# Patient Record
Sex: MALE | Race: WHITE | NOT HISPANIC OR LATINO | Employment: FULL TIME | URBAN - METROPOLITAN AREA
[De-identification: names, ages, dates, MRNs, and addresses within clinical notes are randomized per-mention and may not be internally consistent; named-entity substitution may affect disease eponyms.]

---

## 2022-02-18 ENCOUNTER — APPOINTMENT (OUTPATIENT)
Dept: URGENT CARE | Age: 47
End: 2022-02-18
Payer: OTHER MISCELLANEOUS

## 2022-02-18 ENCOUNTER — APPOINTMENT (OUTPATIENT)
Dept: RADIOLOGY | Age: 47
End: 2022-02-18
Payer: OTHER MISCELLANEOUS

## 2022-02-18 DIAGNOSIS — S69.92XA INJURY OF LEFT HAND, INITIAL ENCOUNTER: ICD-10-CM

## 2022-02-18 DIAGNOSIS — S69.92XA INJURY OF LEFT HAND, INITIAL ENCOUNTER: Primary | ICD-10-CM

## 2022-02-18 PROCEDURE — 73130 X-RAY EXAM OF HAND: CPT

## 2022-02-18 PROCEDURE — G0382 LEV 3 HOSP TYPE B ED VISIT: HCPCS | Performed by: STUDENT IN AN ORGANIZED HEALTH CARE EDUCATION/TRAINING PROGRAM

## 2022-02-18 PROCEDURE — 99283 EMERGENCY DEPT VISIT LOW MDM: CPT | Performed by: STUDENT IN AN ORGANIZED HEALTH CARE EDUCATION/TRAINING PROGRAM

## 2022-02-24 VITALS — HEIGHT: 76 IN | BODY MASS INDEX: 25.57 KG/M2 | WEIGHT: 210 LBS

## 2022-02-24 DIAGNOSIS — S62.605A FRACTURE OF UNSPECIFIED PHALANX OF LEFT RING FINGER, INITIAL ENCOUNTER FOR CLOSED FRACTURE: Primary | ICD-10-CM

## 2022-02-24 PROCEDURE — 99203 OFFICE O/P NEW LOW 30 MIN: CPT | Performed by: PHYSICIAN ASSISTANT

## 2022-02-24 NOTE — PROGRESS NOTES
Assessment:    Left 4th distal phalanx fracture, closed, with subungual hematoma that is improving  Left 3rd distal phalanx small subungual hematoma, no fracture      Plan:    Non-weight bearing to the left 3rd/4th fingers in alumofoam splint  Recommend splinting the middle finger as well and using buddy tape given his injury to this distal phalanx   OTC pain medications as needed   Ice/Elevation  Follow-up 2 weeks for re-evaluation        Problem List Items Addressed This Visit        Musculoskeletal and Integument    Fracture of unspecified phalanx of left ring finger, initial encounter for closed fracture - Primary                   Subjective:     Patient ID:  Lacy Elias is a 40 y o  male    HPI    46-year-old male presenting for evaluation of his left fourth finger  According to the patient, about one week ago he was at work when his left third and fourth finger tips got caught in between last concrete felt immediate pain  He is found have a fracture of the left distal phalanx and referred here for further evaluation he denies any open wounds  Overall he states the subungual hematoma has improved over the last week and is no longer throbbing  The following portions of the patient's history were reviewed and updated as appropriate: allergies, current medications, past family history, past medical history, past social history, past surgical history and problem list     Review of Systems     Objective:    Imaging:  Left 4th finger x-rays 2/18/2022  IMPRESSION:     There is a comminuted fracture of the tuft of the 4th distal phalanx, without significant displacement  Physical Exam     Orthopedic Examination:  Left 4th finger     Inspection:  Subungual hematoma  No open wound    Nail bed intact    Palpation:  Tender to palpation distal phalanx    Range-of-motion:  Limited at the D IP joint, normal range of motion at MCP PIP    Strength:  Deferred    Sensation:  Intact median radial ulnar nerve distribution    Special Tests:  Good cap refill at the fingertips palpable radial pulse

## 2022-02-24 NOTE — LETTER
February 24, 2022     Patient: Arnold Pepe   YOB: 1975   Date of Visit: 2/24/2022       To Whom it May Concern:    Eusebia Schwab  was seen in my office on 2/24/2022  This should serve as improve that the patient was here today  Our recommendation is to continue current light duty restrictions are already in place for an additional two weeks until his next follow-up with Orthopedics  Further work restrictions will be provided at that time  If you have any questions or concerns, please don't hesitate to call           Sincerely,          Perla Bailey PA-C        CC: No Recipients

## 2022-03-10 VITALS
DIASTOLIC BLOOD PRESSURE: 77 MMHG | SYSTOLIC BLOOD PRESSURE: 144 MMHG | HEIGHT: 76 IN | BODY MASS INDEX: 25.57 KG/M2 | WEIGHT: 210 LBS | HEART RATE: 51 BPM

## 2022-03-10 DIAGNOSIS — S62.639A CLOSED FRACTURE OF TUFT OF DISTAL PHALANX OF FINGER: Primary | ICD-10-CM

## 2022-03-10 PROCEDURE — 99214 OFFICE O/P EST MOD 30 MIN: CPT | Performed by: FAMILY MEDICINE

## 2022-03-10 NOTE — PATIENT INSTRUCTIONS
Explained the patient that he has a tuft fracture of the distal phalanx of his ring finger  He also has subungual  hematoma covering the entire nail of the ring finger as well as hematoma of the middle finger covering approximately 40%  Explained that ring finger which may developed nail deformity and is at risk for infection  I recommended maintain try gloves and finger  Recommended finger gloves rubber to maintain dryness  Also recommended against check caring  He may return to work approval all other duties as tolerated

## 2022-03-10 NOTE — PROGRESS NOTES
1  Closed fracture of tuft of distal phalanx of finger       No orders of the defined types were placed in this encounter  IMAGING STUDIES: (I personally reviewed images in PACS and report):     Xray left hand 2/18/22: There is a comminuted fracture of the tuft of the 4th distal phalanx, without significant displacement  PAST REPORTS:        ASSESSMENT/PLAN:   Left 4th distal phalanx comminuted tuft fracture with diffuse subungual hematoma that is improving  Left 3rd distal phalanx small subungual hematoma  DOI: approximately 2/17/22  FUI: 3-4 weeks    Repeat X-ray next visit: Left Hand    Return in about 6 weeks (around 4/21/2022)  Patient Instructions   Explained the patient that he has a tuft fracture of the distal phalanx of his ring finger  He also has subungual  hematoma covering the entire nail of the ring finger as well as hematoma of the middle finger covering approximately 40%  Explained that ring finger which may developed nail deformity and is at risk for infection  I recommended maintain try gloves and finger  Recommended finger gloves rubber to maintain dryness  Also recommended against check caring  He may return to work approval all other duties as tolerated         __________________________________________________________________________    CHIEF COMPLAINT:  Left hand injury    HPI:  Khanh Aragon is a 40 y o  male  who presents for       Visit 03/10/2022:   Left hand injury at work on or about 02/17/2022 with patient's finger was crushed by heavy object  Was seen by physician assistant who found tuft fracture and avoid hematoma  Today patient tells me that it is improving however continues to have nail discoloration is noted in picture below            Review of Systems      Following history reviewed and update:    Past Medical History:   Diagnosis Date    Skin cancer (melanoma) ([facility] [address] ) 2015     Past Surgical History:   Procedure Laterality Date    TOOTH EXTRACTION Social History   Social History     Substance and Sexual Activity   Alcohol Use Yes    Comment: socially     Social History     Substance and Sexual Activity   Drug Use Never     Social History     Tobacco Use   Smoking Status Current Every Day Smoker    Packs/day: 0 25    Types: Cigarettes   Smokeless Tobacco Never Used     No family history on file  No Known Allergies       Physical Exam  /77 (BP Location: Left arm, Patient Position: Sitting, Cuff Size: Adult)   Pulse (!) 51   Ht 6' 4" (1 93 m)   Wt 95 3 kg (210 lb)   BMI 25 56 kg/m²     Constitutional:  see vital signs  Gen: well-developed, normocephalic/atraumatic, well-groomed  Eyes: No inflammation or discharge of conjunctiva or lids; sclera clear   Pharynx: no inflammation, lesion, or mass of lips  Neck: supple, no masses, non-distended  MSK: no inflammation, lesion, mass, or clubbing of nails and digits except for other than mentioned below  SKIN: no visible rashes or skin lesions other than left hand as showed in pictures below  Pulmonary/Chest: Effort normal  No respiratory distress     NEURO: cranial nerves grossly intact  PSYCH:  Alert and oriented to person, place, and time; recent and remote memory intact; mood normal, no depression, anxiety, or agitation, judgment and insight good and intact     Ortho Exam      Left Hand  no erythema  swelling:  Mild distal phalanx ring finger  Tenderness:+ distal phalanx ring finger  rom fingers mcp, pip, dip intact without pain  No digital scissoring or deviation of fingers  no extensor lag  no rotation of fingers  no joint laxity  strenght flexion and extension mcp, pip, dip 5/5  sensation intact  capillary refill intact   Froment sign:  normal  OK sign:  Normal  Thumb extension:  5/5   __________________________________________________________________________  Procedures

## 2022-03-10 NOTE — LETTER
March 10, 2022     Patient: Anthony Golden   YOB: 1975   Date of Visit: 3/10/2022       To Whom it May Concern:    Irena Snider is under my professional care  He was seen in my office on 3/10/2022  He may return to work on 65/23  I recommend light duty restrictions to include no [employer] hammering and no exposure to sewage water  Patient will be reevaluated on or about 5/1/22  If you have any questions or concerns, please don't hesitate to call           Sincerely,          Cahrley Seymour III,         CC: Anthony Muroill

## 2022-03-24 ENCOUNTER — OFFICE VISIT (OUTPATIENT)
Dept: OBGYN CLINIC | Facility: OTHER | Age: 47
End: 2022-03-24
Payer: OTHER MISCELLANEOUS

## 2022-03-24 VITALS
SYSTOLIC BLOOD PRESSURE: 156 MMHG | HEART RATE: 51 BPM | WEIGHT: 212 LBS | DIASTOLIC BLOOD PRESSURE: 88 MMHG | BODY MASS INDEX: 25.82 KG/M2 | HEIGHT: 76 IN

## 2022-03-24 DIAGNOSIS — S62.639D CLOSED FRACTURE OF TUFT OF DISTAL PHALANX OF FINGER WITH ROUTINE HEALING: Primary | ICD-10-CM

## 2022-03-24 PROCEDURE — 99213 OFFICE O/P EST LOW 20 MIN: CPT | Performed by: FAMILY MEDICINE

## 2022-03-24 NOTE — LETTER
March 24, 2022     Patient: Adolfo Siddiqi   YOB: 1975   Date of Visit: 3/24/2022       To Whom it May Concern:    Gabriel Alvarenga is under my professional care  He was seen in my office on 3/24/2022  He can return to work on 03/28/2022 with no restrictions  If you have any questions or concerns, please don't hesitate to call         Sincerely,          Mark Zavaleta III, DO        CC: No Recipients

## 2022-03-24 NOTE — PROGRESS NOTES
1  Closed fracture of tuft of distal phalanx of finger with routine healing  CANCELED: XR hand 3+ vw left     No orders of the defined types were placed in this encounter  Imaging Studies (I personally reviewed images in PACS and report):    Xray of left hand 2/18/22: There is a comminuted fracture of the tuft of the 4 th distal phalanx, without significant displacement  IMPRESSION:  Left 4th distal phalanx comminuted tuft fracture  Left 3rd distal phalanx small subungual hematoma  DOI:  Approximately 02/17/2022  FUI:  5 weeks      Repeat X-ray next visit: None    Return if symptoms worsen or fail to improve  Patient Instructions   Discussed with patient regarding his symptoms today  He feels 100% today and I have advised him he can return to work without any restrictions at this time  He still has a subungual hematoma at the 4th and 3rd distal phalanx  We will discuss with our surgical colleagues regarding this and follow-up with him  CHIEF COMPLAINT:  Follow-up left hand injury    HPI:  Shilo Gonzales is a 40 y o  male  who presents for       Visit 3/24/2022 :  Patient present for follow up of left 4 th finger tuft fracture  He was last seen and examined on 3/10/22  He states he has no pain today and feels 100 percent  He reports no new injury  Review of Systems   Constitutional: Negative for chills and fever  HENT: Negative for ear pain and sore throat  Eyes: Negative for pain and visual disturbance  Respiratory: Negative for cough and shortness of breath  Cardiovascular: Negative for chest pain and palpitations  Gastrointestinal: Negative for abdominal pain and vomiting  Genitourinary: Negative for dysuria and hematuria  Musculoskeletal: Negative for arthralgias and back pain  Skin: Negative for color change and rash  Neurological: Negative for seizures and syncope  All other systems reviewed and are negative          Following history reviewed and update:    Past Medical History:   Diagnosis Date    Skin cancer (melanoma) ([facility] [address] ) 2015     Past Surgical History:   Procedure Laterality Date    TOOTH EXTRACTION       Social History   Social History     Substance and Sexual Activity   Alcohol Use Yes    Comment: socially     Social History     Substance and Sexual Activity   Drug Use Never     Social History     Tobacco Use   Smoking Status Current Every Day Smoker    Packs/day: 0 25    Types: Cigarettes   Smokeless Tobacco Never Used     No family history on file  No Known Allergies       Physical Exam  /88 (BP Location: Right arm, Patient Position: Sitting, Cuff Size: Adult)   Pulse (!) 51   Ht 6' 4" (1 93 m)   Wt 96 2 kg (212 lb)   BMI 25 81 kg/m²     Constitutional:  see vital signs  Gen: well-developed, normocephalic/atraumatic, well-groomed  Eyes: No inflammation or discharge of conjunctiva or lids; sclera clear   Pharynx: no inflammation, lesion, or mass of lips  Neck: supple, no masses, non-distended  MSK: no inflammation, lesion, mass, or clubbing of nails and digits except for other than mentioned below  SKIN: no visible rashes or skin lesions  Pulmonary/Chest: Effort normal  No respiratory distress  NEURO: cranial nerves grossly intact  PSYCH:  Alert and oriented to person, place, and time; recent and remote memory intact; mood normal, no depression, anxiety, or agitation, judgment and insight good and intact     Ortho Exam    Left Elbow:  no swelling, erythema, or increased warmth  rom full  nontender  no laxity of joint  Cubital tunnel Tinel's test:  Distal Biceps Hook test:    Left Wrist  no swelling, erythema, or increased warmth  rom full  nontender  no laxity of joint; druj stable  Carpal tunnel compression test:  Phalen's test:  Tinel's carpal tunnel test:    Left Hand  no erythema  Swelling: None  Subungual hematoma of 3rd and 4th distal phalanx     Tenderness: none  rom fingers mcp, pip, dip intact without pain  No digital scissoring or deviation of fingers  no extensor lag  no rotation of fingers  no joint laxity  strenght flexion and extension mcp, pip, dip 5/5  sensation intact  capillary refill intact   Froment sign:  normal  OK sign:  Normal  Thumb extension:  5/5  Procedures

## 2022-03-24 NOTE — PATIENT INSTRUCTIONS
Discussed with patient regarding his symptoms today  He feels 100% today and I have advised him he can return to work without any restrictions at this time  He still has a subungual hematoma at the 4th and 3rd distal phalanx  We will discuss with our surgical colleagues regarding this and follow-up with him